# Patient Record
(demographics unavailable — no encounter records)

---

## 2024-10-13 NOTE — DISCUSSION/SUMMARY
[FreeTextEntry1] : Borderline mild airflow limitation most likely indicative of reactive airway disease.  Consistent with positive response to Breo Ellipta and clinical scenario. Does have cats which may be an issue. History of allergies.

## 2024-10-13 NOTE — HISTORY OF PRESENT ILLNESS
[Never] : never [Current] : current [TextBox_4] : MAXINE RUELAS is a 27 year old  M referred for pulmonary evaluation  Feels some mild breathing issues with tightness for past year. Does smoke marijuana and occ. vapes. About 1 month ago very SOB and went to ER and discharged on inhaler. Albuterol. Told to f/u with primary care. Saw MD and given Breo. and nasal spray. Since improved. Presently feels much better. Presently no beta agonist.   Presently no cough, wheezing, CP or SOB  Past pulmonary history. N Occupational Exposure. MTA no exposures.  Family history of pulmonary disease. Grandfather lung cancer.  Recent travel N Pets Cats ?? allergic.

## 2024-10-13 NOTE — PROCEDURE
[FreeTextEntry1] : 10/11/2024 Pulmonary function testing FEV1 is reduced, FVC and FEV1/FVC are normal. TLC and subdivisions are normal. RV/TLC ratio is normal. Single breath diffusion capacity is normal.

## 2025-01-31 NOTE — ASSESSMENT
[FreeTextEntry1] : Trial off Breo in spring. Continue Airspura PRN Follow-up in 6 months or sooner on a as needed basis. All discussed with patient.

## 2025-01-31 NOTE — DISCUSSION/SUMMARY
[FreeTextEntry1] : Borderline mild airflow limitation most likely indicative of reactive airway disease.  Consistent with positive response to Breo Ellipta and clinical scenario.  Improved. Does have cats which may be an issue. History of allergies.

## 2025-01-31 NOTE — PROCEDURE
[FreeTextEntry1] : 01/31/2025 Pulmonary function testing Normal flow rates. Mild increase in flow rates compared to October 2024.

## 2025-01-31 NOTE — HISTORY OF PRESENT ILLNESS
[Never] : never [Current] : current [TextBox_4] :  Presently feels much better. Presently no beta agonist.  Use. Compliant to Breo. doing better with nasal spray Flonase Presently no cough, wheezing, CP or SOB   Pets Cats ?? allergic.

## 2025-07-11 NOTE — DISCUSSION/SUMMARY
[FreeTextEntry1] : Borderline mild airflow limitation most likely indicative of reactive airway disease.  Consistent with positive response to Breo Ellipta and clinical scenario.  Feels increased symptom complex off of Breo.  Mild elevation in NIOX.  Consistent. Does have cats which may be an issue. History of allergies.

## 2025-07-11 NOTE — ASSESSMENT
[FreeTextEntry1] : Trial restart Breo Continue Airspura PRN Follow-up in 6 months or sooner on a as needed basis. All discussed with patient.

## 2025-07-11 NOTE — PROCEDURE
[FreeTextEntry1] : 07/11/2025 Pulmonary function testing Normal flow rates without significant bronchodilator response. No significant change in flow rates compared to January 2025.  NIOX 30 mildly elevated.

## 2025-07-11 NOTE — HISTORY OF PRESENT ILLNESS
[Never] : never [Current] : current [TextBox_4] : Was off Breo since around Feb or March.  now feels need with hotter weather. Some beta agonist  use with positive response. No cough or wheeze.    Presently feels much better. Presently no beta agonist.  Use. Compliant to Breo. doing better with nasal spray Flonase Presently no cough, wheezing, CP or SOB   Pets Cats ?? allergic.